# Patient Record
Sex: FEMALE | Race: WHITE | Employment: UNEMPLOYED | URBAN - METROPOLITAN AREA
[De-identification: names, ages, dates, MRNs, and addresses within clinical notes are randomized per-mention and may not be internally consistent; named-entity substitution may affect disease eponyms.]

---

## 2020-10-11 ENCOUNTER — APPOINTMENT (OUTPATIENT)
Dept: CT IMAGING | Age: 40
DRG: 872 | End: 2020-10-11
Payer: MEDICAID

## 2020-10-11 ENCOUNTER — HOSPITAL ENCOUNTER (INPATIENT)
Age: 40
LOS: 2 days | Discharge: HOME | DRG: 872 | End: 2020-10-13
Attending: EMERGENCY MEDICINE | Admitting: INTERNAL MEDICINE
Payer: MEDICAID

## 2020-10-11 ENCOUNTER — APPOINTMENT (OUTPATIENT)
Dept: GENERAL RADIOLOGY | Age: 40
DRG: 872 | End: 2020-10-11
Payer: MEDICAID

## 2020-10-11 PROBLEM — N12 PYELONEPHRITIS: Status: ACTIVE | Noted: 2020-10-11

## 2020-10-11 LAB
A/G RATIO: 1.1 (ref 1.1–2.2)
ALBUMIN SERPL-MCNC: 3.5 G/DL (ref 3.4–5)
ALP BLD-CCNC: 130 U/L (ref 40–129)
ALT SERPL-CCNC: 27 U/L (ref 10–40)
AMPHETAMINE SCREEN, URINE: NORMAL
ANION GAP SERPL CALCULATED.3IONS-SCNC: 14 MMOL/L (ref 3–16)
AST SERPL-CCNC: 30 U/L (ref 15–37)
ATYPICAL LYMPHOCYTE RELATIVE PERCENT: 1 % (ref 0–6)
BACTERIA: ABNORMAL /HPF
BARBITURATE SCREEN URINE: NORMAL
BASOPHILS ABSOLUTE: 0 K/UL (ref 0–0.2)
BASOPHILS RELATIVE PERCENT: 0 %
BENZODIAZEPINE SCREEN, URINE: NORMAL
BILIRUB SERPL-MCNC: <0.2 MG/DL (ref 0–1)
BILIRUBIN URINE: NEGATIVE
BLOOD, URINE: ABNORMAL
BUN BLDV-MCNC: 14 MG/DL (ref 7–20)
CALCIUM SERPL-MCNC: 9.1 MG/DL (ref 8.3–10.6)
CANNABINOID SCREEN URINE: NORMAL
CHLORIDE BLD-SCNC: 101 MMOL/L (ref 99–110)
CLARITY: ABNORMAL
CO2: 21 MMOL/L (ref 21–32)
COCAINE METABOLITE SCREEN URINE: NORMAL
COLOR: YELLOW
CREAT SERPL-MCNC: 1.1 MG/DL (ref 0.6–1.1)
EOSINOPHILS ABSOLUTE: 0.2 K/UL (ref 0–0.6)
EOSINOPHILS RELATIVE PERCENT: 1 %
EPITHELIAL CELLS, UA: ABNORMAL /HPF (ref 0–5)
GFR AFRICAN AMERICAN: >60
GFR NON-AFRICAN AMERICAN: 56
GLOBULIN: 3.1 G/DL
GLUCOSE BLD-MCNC: 113 MG/DL (ref 70–99)
GLUCOSE URINE: NEGATIVE MG/DL
HCG QUALITATIVE: NEGATIVE
HCT VFR BLD CALC: 35.4 % (ref 36–48)
HEMOGLOBIN: 11.8 G/DL (ref 12–16)
KETONES, URINE: ABNORMAL MG/DL
LACTIC ACID, SEPSIS: 1.1 MMOL/L (ref 0.4–1.9)
LACTIC ACID, SEPSIS: 1.3 MMOL/L (ref 0.4–1.9)
LEUKOCYTE ESTERASE, URINE: ABNORMAL
LYMPHOCYTES ABSOLUTE: 2.3 K/UL (ref 1–5.1)
LYMPHOCYTES RELATIVE PERCENT: 12 %
Lab: NORMAL
MCH RBC QN AUTO: 31.6 PG (ref 26–34)
MCHC RBC AUTO-ENTMCNC: 33.4 G/DL (ref 31–36)
MCV RBC AUTO: 94.6 FL (ref 80–100)
METHADONE SCREEN, URINE: NORMAL
MICROSCOPIC EXAMINATION: YES
MONOCYTES ABSOLUTE: 1.7 K/UL (ref 0–1.3)
MONOCYTES RELATIVE PERCENT: 10 %
MUCUS: ABNORMAL /LPF
NEUTROPHILS ABSOLUTE: 13.2 K/UL (ref 1.7–7.7)
NEUTROPHILS RELATIVE PERCENT: 76 %
NITRITE, URINE: POSITIVE
OPIATE SCREEN URINE: NORMAL
OXYCODONE URINE: NORMAL
PDW BLD-RTO: 14.1 % (ref 12.4–15.4)
PH UA: 6
PH UA: 6 (ref 5–8)
PHENCYCLIDINE SCREEN URINE: NORMAL
PLATELET # BLD: 224 K/UL (ref 135–450)
PLATELET SLIDE REVIEW: ADEQUATE
PMV BLD AUTO: 10.2 FL (ref 5–10.5)
POTASSIUM REFLEX MAGNESIUM: 3.7 MMOL/L (ref 3.5–5.1)
PROPOXYPHENE SCREEN: NORMAL
PROTEIN UA: 30 MG/DL
RBC # BLD: 3.74 M/UL (ref 4–5.2)
RBC UA: ABNORMAL /HPF (ref 0–4)
SARS-COV-2, NAAT: NOT DETECTED
SLIDE REVIEW: ABNORMAL
SODIUM BLD-SCNC: 136 MMOL/L (ref 136–145)
SPECIFIC GRAVITY UA: 1.02 (ref 1–1.03)
TOTAL PROTEIN: 6.6 G/DL (ref 6.4–8.2)
URINE TYPE: ABNORMAL
UROBILINOGEN, URINE: 0.2 E.U./DL
WBC # BLD: 17.4 K/UL (ref 4–11)
WBC UA: >100 /HPF (ref 0–5)

## 2020-10-11 PROCEDURE — U0002 COVID-19 LAB TEST NON-CDC: HCPCS

## 2020-10-11 PROCEDURE — 85025 COMPLETE CBC W/AUTO DIFF WBC: CPT

## 2020-10-11 PROCEDURE — 2580000003 HC RX 258: Performed by: PHYSICIAN ASSISTANT

## 2020-10-11 PROCEDURE — 96365 THER/PROPH/DIAG IV INF INIT: CPT

## 2020-10-11 PROCEDURE — 2580000003 HC RX 258

## 2020-10-11 PROCEDURE — 6360000002 HC RX W HCPCS: Performed by: PHYSICIAN ASSISTANT

## 2020-10-11 PROCEDURE — 87086 URINE CULTURE/COLONY COUNT: CPT

## 2020-10-11 PROCEDURE — 96375 TX/PRO/DX INJ NEW DRUG ADDON: CPT

## 2020-10-11 PROCEDURE — 87077 CULTURE AEROBIC IDENTIFY: CPT

## 2020-10-11 PROCEDURE — 80307 DRUG TEST PRSMV CHEM ANLYZR: CPT

## 2020-10-11 PROCEDURE — 99285 EMERGENCY DEPT VISIT HI MDM: CPT

## 2020-10-11 PROCEDURE — 6360000004 HC RX CONTRAST MEDICATION: Performed by: EMERGENCY MEDICINE

## 2020-10-11 PROCEDURE — 87040 BLOOD CULTURE FOR BACTERIA: CPT

## 2020-10-11 PROCEDURE — 81001 URINALYSIS AUTO W/SCOPE: CPT

## 2020-10-11 PROCEDURE — 87186 SC STD MICRODIL/AGAR DIL: CPT

## 2020-10-11 PROCEDURE — 2580000003 HC RX 258: Performed by: EMERGENCY MEDICINE

## 2020-10-11 PROCEDURE — 71045 X-RAY EXAM CHEST 1 VIEW: CPT

## 2020-10-11 PROCEDURE — 6360000002 HC RX W HCPCS: Performed by: EMERGENCY MEDICINE

## 2020-10-11 PROCEDURE — 84703 CHORIONIC GONADOTROPIN ASSAY: CPT

## 2020-10-11 PROCEDURE — 36415 COLL VENOUS BLD VENIPUNCTURE: CPT

## 2020-10-11 PROCEDURE — 83605 ASSAY OF LACTIC ACID: CPT

## 2020-10-11 PROCEDURE — 1200000000 HC SEMI PRIVATE

## 2020-10-11 PROCEDURE — 80053 COMPREHEN METABOLIC PANEL: CPT

## 2020-10-11 PROCEDURE — 74177 CT ABD & PELVIS W/CONTRAST: CPT

## 2020-10-11 PROCEDURE — 6370000000 HC RX 637 (ALT 250 FOR IP): Performed by: PHYSICIAN ASSISTANT

## 2020-10-11 PROCEDURE — 6360000002 HC RX W HCPCS: Performed by: INTERNAL MEDICINE

## 2020-10-11 RX ORDER — MORPHINE SULFATE 2 MG/ML
2 INJECTION, SOLUTION INTRAMUSCULAR; INTRAVENOUS EVERY 6 HOURS PRN
Status: DISCONTINUED | OUTPATIENT
Start: 2020-10-11 | End: 2020-10-13 | Stop reason: HOSPADM

## 2020-10-11 RX ORDER — HYDROXYZINE PAMOATE 25 MG/1
25 CAPSULE ORAL 3 TIMES DAILY PRN
COMMUNITY

## 2020-10-11 RX ORDER — ACETAMINOPHEN 325 MG/1
650 TABLET ORAL EVERY 6 HOURS PRN
Status: DISCONTINUED | OUTPATIENT
Start: 2020-10-11 | End: 2020-10-13 | Stop reason: HOSPADM

## 2020-10-11 RX ORDER — DOXEPIN HYDROCHLORIDE 10 MG/1
10 CAPSULE ORAL NIGHTLY
COMMUNITY

## 2020-10-11 RX ORDER — 0.9 % SODIUM CHLORIDE 0.9 %
1000 INTRAVENOUS SOLUTION INTRAVENOUS ONCE
Status: COMPLETED | OUTPATIENT
Start: 2020-10-11 | End: 2020-10-11

## 2020-10-11 RX ORDER — FENTANYL CITRATE 50 UG/ML
50 INJECTION, SOLUTION INTRAMUSCULAR; INTRAVENOUS EVERY 30 MIN PRN
Status: DISCONTINUED | OUTPATIENT
Start: 2020-10-11 | End: 2020-10-11

## 2020-10-11 RX ORDER — SODIUM CHLORIDE 9 MG/ML
1000 INJECTION, SOLUTION INTRAVENOUS CONTINUOUS
Status: DISCONTINUED | OUTPATIENT
Start: 2020-10-11 | End: 2020-10-11

## 2020-10-11 RX ORDER — ONDANSETRON 2 MG/ML
4 INJECTION INTRAMUSCULAR; INTRAVENOUS EVERY 6 HOURS PRN
Status: DISCONTINUED | OUTPATIENT
Start: 2020-10-11 | End: 2020-10-13 | Stop reason: HOSPADM

## 2020-10-11 RX ORDER — SODIUM CHLORIDE 0.9 % (FLUSH) 0.9 %
10 SYRINGE (ML) INJECTION EVERY 12 HOURS SCHEDULED
Status: DISCONTINUED | OUTPATIENT
Start: 2020-10-11 | End: 2020-10-13 | Stop reason: HOSPADM

## 2020-10-11 RX ORDER — ACETAMINOPHEN 650 MG/1
650 SUPPOSITORY RECTAL EVERY 6 HOURS PRN
Status: DISCONTINUED | OUTPATIENT
Start: 2020-10-11 | End: 2020-10-13 | Stop reason: HOSPADM

## 2020-10-11 RX ORDER — SODIUM CHLORIDE 9 MG/ML
INJECTION, SOLUTION INTRAVENOUS CONTINUOUS
Status: DISCONTINUED | OUTPATIENT
Start: 2020-10-11 | End: 2020-10-13 | Stop reason: HOSPADM

## 2020-10-11 RX ORDER — SODIUM CHLORIDE 9 MG/ML
INJECTION, SOLUTION INTRAVENOUS
Status: COMPLETED
Start: 2020-10-11 | End: 2020-10-11

## 2020-10-11 RX ORDER — SODIUM CHLORIDE 0.9 % (FLUSH) 0.9 %
10 SYRINGE (ML) INJECTION PRN
Status: DISCONTINUED | OUTPATIENT
Start: 2020-10-11 | End: 2020-10-13 | Stop reason: HOSPADM

## 2020-10-11 RX ORDER — POLYETHYLENE GLYCOL 3350 17 G/17G
17 POWDER, FOR SOLUTION ORAL DAILY PRN
Status: DISCONTINUED | OUTPATIENT
Start: 2020-10-11 | End: 2020-10-13 | Stop reason: HOSPADM

## 2020-10-11 RX ORDER — DOXEPIN HYDROCHLORIDE 10 MG/1
10 CAPSULE ORAL NIGHTLY
Status: DISCONTINUED | OUTPATIENT
Start: 2020-10-11 | End: 2020-10-13 | Stop reason: HOSPADM

## 2020-10-11 RX ORDER — DIPHENHYDRAMINE HCL 25 MG
25 TABLET ORAL EVERY 6 HOURS PRN
Status: DISCONTINUED | OUTPATIENT
Start: 2020-10-11 | End: 2020-10-12

## 2020-10-11 RX ORDER — 0.9 % SODIUM CHLORIDE 0.9 %
30 INTRAVENOUS SOLUTION INTRAVENOUS ONCE
Status: COMPLETED | OUTPATIENT
Start: 2020-10-11 | End: 2020-10-11

## 2020-10-11 RX ORDER — PROMETHAZINE HYDROCHLORIDE 25 MG/1
12.5 TABLET ORAL EVERY 6 HOURS PRN
Status: DISCONTINUED | OUTPATIENT
Start: 2020-10-11 | End: 2020-10-13 | Stop reason: HOSPADM

## 2020-10-11 RX ADMIN — DOXEPIN HYDROCHLORIDE 10 MG: 10 CAPSULE ORAL at 20:24

## 2020-10-11 RX ADMIN — SODIUM CHLORIDE 2859 ML: 9 INJECTION, SOLUTION INTRAVENOUS at 11:15

## 2020-10-11 RX ADMIN — IOPAMIDOL 75 ML: 755 INJECTION, SOLUTION INTRAVENOUS at 12:22

## 2020-10-11 RX ADMIN — MORPHINE SULFATE 2 MG: 2 INJECTION, SOLUTION INTRAMUSCULAR; INTRAVENOUS at 16:10

## 2020-10-11 RX ADMIN — SODIUM CHLORIDE 1000 ML: 9 INJECTION, SOLUTION INTRAVENOUS at 13:51

## 2020-10-11 RX ADMIN — FENTANYL CITRATE 50 MCG: 50 INJECTION, SOLUTION INTRAMUSCULAR; INTRAVENOUS at 11:39

## 2020-10-11 RX ADMIN — Medication 10 ML: at 20:19

## 2020-10-11 RX ADMIN — PIPERACILLIN SODIUM AND TAZOBACTAM SODIUM 3.38 G: 3; .375 INJECTION, POWDER, LYOPHILIZED, FOR SOLUTION INTRAVENOUS at 23:17

## 2020-10-11 RX ADMIN — MORPHINE SULFATE 2 MG: 2 INJECTION, SOLUTION INTRAMUSCULAR; INTRAVENOUS at 22:29

## 2020-10-11 RX ADMIN — ENOXAPARIN SODIUM 40 MG: 40 INJECTION SUBCUTANEOUS at 20:24

## 2020-10-11 RX ADMIN — SODIUM CHLORIDE 250 ML: 9 INJECTION, SOLUTION INTRAVENOUS at 20:25

## 2020-10-11 RX ADMIN — ACETAMINOPHEN 650 MG: 325 TABLET ORAL at 16:10

## 2020-10-11 RX ADMIN — SODIUM CHLORIDE 1000 ML: 9 INJECTION, SOLUTION INTRAVENOUS at 16:12

## 2020-10-11 RX ADMIN — PIPERACILLIN SODIUM AND TAZOBACTAM SODIUM 3.38 G: 3; .375 INJECTION, POWDER, LYOPHILIZED, FOR SOLUTION INTRAVENOUS at 16:12

## 2020-10-11 RX ADMIN — CEFTRIAXONE SODIUM 1 G: 1 INJECTION, POWDER, FOR SOLUTION INTRAMUSCULAR; INTRAVENOUS at 12:15

## 2020-10-11 RX ADMIN — SODIUM CHLORIDE: 9 INJECTION, SOLUTION INTRAVENOUS at 16:10

## 2020-10-11 ASSESSMENT — PAIN DESCRIPTION - ORIENTATION
ORIENTATION: RIGHT
ORIENTATION: LEFT
ORIENTATION: RIGHT

## 2020-10-11 ASSESSMENT — PAIN DESCRIPTION - PROGRESSION
CLINICAL_PROGRESSION: GRADUALLY WORSENING
CLINICAL_PROGRESSION: GRADUALLY WORSENING
CLINICAL_PROGRESSION: GRADUALLY IMPROVING

## 2020-10-11 ASSESSMENT — PAIN DESCRIPTION - ONSET
ONSET: PROGRESSIVE
ONSET: ON-GOING

## 2020-10-11 ASSESSMENT — PAIN SCALES - GENERAL
PAINLEVEL_OUTOF10: 10
PAINLEVEL_OUTOF10: 10
PAINLEVEL_OUTOF10: 9
PAINLEVEL_OUTOF10: 8
PAINLEVEL_OUTOF10: 3
PAINLEVEL_OUTOF10: 6
PAINLEVEL_OUTOF10: 7
PAINLEVEL_OUTOF10: 10

## 2020-10-11 ASSESSMENT — PAIN DESCRIPTION - PAIN TYPE
TYPE: ACUTE PAIN

## 2020-10-11 ASSESSMENT — PAIN DESCRIPTION - FREQUENCY
FREQUENCY: CONTINUOUS

## 2020-10-11 ASSESSMENT — PAIN DESCRIPTION - LOCATION
LOCATION: ABDOMEN
LOCATION: ABDOMEN;BACK;FLANK
LOCATION: ABDOMEN

## 2020-10-11 ASSESSMENT — PAIN - FUNCTIONAL ASSESSMENT
PAIN_FUNCTIONAL_ASSESSMENT: ACTIVITIES ARE NOT PREVENTED
PAIN_FUNCTIONAL_ASSESSMENT: PREVENTS OR INTERFERES SOME ACTIVE ACTIVITIES AND ADLS

## 2020-10-11 ASSESSMENT — PAIN DESCRIPTION - DESCRIPTORS
DESCRIPTORS: OTHER (COMMENT)

## 2020-10-11 NOTE — PROGRESS NOTES
Dr. Itzel Villanueva notified of allergy and reviewed. Received verification ok to administer Morphine despite allergy to codeine noted. T/O placed for Morphine 2 mg IV every 6 hours prn.

## 2020-10-11 NOTE — PROGRESS NOTES
Report given @ bedside to [facility], for transferral of care. Pt resting in bed. Call light in reach.

## 2020-10-11 NOTE — PROGRESS NOTES
MEWS 4  and VS improving, except hypotension. NS bolus still infusing. Dr. Judah Amin aware will continue to monitor. Call light in reach.

## 2020-10-11 NOTE — ED PROVIDER NOTES
Emergency Physician Note  [address] [facility] ED  [address] [facility] [place]. [address]  Dept: [phone]  Loc: [phone]  Open Note Time:  2:52 PM    Chief Complaint  Fever       History of Present Illness  Woodroe Leyden is a 42 y.o. female  has a past medical history of Asthma and Seizures ([address] who presents to the ED for ilene pain. Patient was brought into the intermediate. She reports she has had lower abdominal pain for the past 2 days, left is worse than the right. She had developed a fever while in intermediate and they treated her with Tylenol prior to coming to the ER. Patient denies any dysuria, hematuria, nausea, vomiting. Does also report left flank pain. Denies  chills, malaise, chest pain, shortness of breath, cough,  nausea, vomiting, diarrhea, headache, sore throat,  back pain, rash. No palliative/provocative factors. Unless otherwise stated in this report or unable to obtain because of the patient's clinical or mental status as evidenced by the medical record, this patient's positive and negative responses for review of systems, constitutional, psych, eyes, ENT, cardiovascular, respiratory, gastrointestinal, neurological, genitourinary, musculoskeletal, integument systems and systems related to the presenting problem are either stated in the preceding paragraph or were not pertinent or were negative for the symptoms and/or complaints related to the medical problem. I have reviewed the following from the nursing documentation:      Prior to Admission medications    Medication Sig Start Date End Date Taking?  Authorizing Provider   hydrOXYzine (VISTARIL) 25 MG capsule Take 25 mg by mouth 3 times daily as needed for Itching   Yes Historical Provider, MD   doxepin (SINEQUAN) 10 MG capsule Take 10 mg by mouth nightly   Yes Historical Provider, MD   buPROPion (WELLBUTRIN) 100 MG tablet Take 100 mg by mouth 2 times daily    Historical Provider, MD hydrochlorothiazide (HYDRODIURIL) 12.5 MG tablet Take 12.5 mg by mouth daily    Historical Provider, MD   gabapentin (NEURONTIN) 600 MG tablet Take 600 mg by mouth 3 times daily. Historical Provider, MD       Allergies as of 10/11/2020 - Review Complete 10/11/2020   Allergen Reaction Noted    Codeine Swelling 05/03/2012    Ibuprofen Swelling 05/03/2012    Tramadol Swelling 05/03/2012       Past Medical History:   Diagnosis Date    Asthma     Seizures ([facility] [address]         Surgical History:   Past Surgical History:   Procedure Laterality Date    ABDOMEN SURGERY          Family History:  No family history on file.     Social History     Socioeconomic History    Marital status:      Spouse name: Not on file    Number of children: Not on file    Years of education: Not on file    Highest education level: Not on file   Occupational History    Not on file   Social Needs    Financial resource strain: Not on file    Food insecurity     Worry: Not on file     Inability: Not on file    Transportation needs     Medical: Not on file     Non-medical: Not on file   Tobacco Use    Smoking status: Current Every Day Smoker     Packs/day: 1.00     Types: Cigarettes   Substance and Sexual Activity    Alcohol use: No    Drug use: No    Sexual activity: Not on file   Lifestyle    Physical activity     Days per week: Not on file     Minutes per session: Not on file    Stress: Not on file   Relationships    Social connections     Talks on phone: Not on file     Gets together: Not on file     Attends Congregational service: Not on file     Active member of club or organization: Not on file     Attends meetings of clubs or organizations: Not on file     Relationship status: Not on file    Intimate partner violence     Fear of current or ex partner: Not on file     Emotionally abused: Not on file     Physically abused: Not on file     Forced sexual activity: Not on file   Other Topics Concern    Not on file   Social History Narrative    Not on file       Nursing notes reviewed. ED Triage Vitals   Enc Vitals Group      BP 10/11/20 0840 100/63      Pulse 10/11/20 0837 101      Resp 10/11/20 0837 17      Temp 10/11/20 0837 99.9 °F (37.7 °C)      Temp Source 10/11/20 0837 Oral      SpO2 10/11/20 0837 95 %      Weight 10/11/20 0837 210 lb (95.3 kg)      Height 10/11/20 0837 6' 2\" (1.88 m)      Head Circumference --       Peak Flow --       Pain Score --       Pain Loc --       Pain Edu? --       Excl. in GC? --        GENERAL:      Awake, alert. Well developed, well nourished with no apparent distress. Nontoxic-appearing, ill-appearing. HENT:    Normocephalic, Atraumatic, no lacerations. No ENT exam due to PPE. EYES:    Conjunctiva normal,   Pupils equal round and reactive to light,   Extraocular movements normal.  NECK:      Trachea is midline. No stridor. CHEST:      Regular rate and regular rhythm, no murmurs/rubs/gallops,   normal S1/S2, chest wall non-tender. LUNGS:      No respiratory distress. No abdominal retractions, no sternal retractions. Clear to auscultation bilaterally, no wheezing, no rhochi, no rales  Speaking comfortably in full sentences  ABDOMEN:      Soft, moderate bilateral lower quadrant tenderness to palpation, left worse than right, non-distended. No guarding. No rebound. Bilateral costovertebral angle tenderness to palpation, left worse than right. Normal BS, no organomegaly, no abdominal masses  EXTREMITIES:     Moves extremities x4 with purpose. no edema,   no deformity,   distal pulses present and equal bilaterally. SKIN:      Warm, clammy and intact. NEUROLOGIC:    Normal mental status. Moving all extremities to command. Alert and oriented x4   without focal motor deficit or gross sensory deficit. Normal speech.     PSYCHIATRIC:    Not anxious,   normal mood and affect,   thoughts are linear and organized,   without delusions/hallucinations,   Not responding to internal stimuli,  responds appropriately to questions    LABS and DIAGNOSTIC RESULTS    RADIOLOGY  X-RAYS:  I have reviewed radiologic plain film image(s). ALL OTHER NON-PLAIN FILM IMAGES SUCH AS CT, ULTRASOUND AND MRI HAVE BEEN READ BY THE RADIOLOGIST. CT ABDOMEN PELVIS W IV CONTRAST Additional Contrast? None   Final Result   1. Left-sided pyelonephritis as described. 2. Atherosclerotic disease as detailed above which is advanced for patient   age including left-sided coronary artery calcification. XR CHEST PORTABLE   Final Result   No acute airspace disease identified.               LABS  Results for orders placed or performed during the hospital encounter of 10/11/20   CBC auto differential   Result Value Ref Range    WBC 17.4 (H) 4.0 - 11.0 K/uL    RBC 3.74 (L) 4.00 - 5.20 M/uL    Hemoglobin 11.8 (L) 12.0 - 16.0 g/dL    Hematocrit 35.4 (L) 36.0 - 48.0 %    MCV 94.6 80.0 - 100.0 fL    MCH 31.6 26.0 - 34.0 pg    MCHC 33.4 31.0 - 36.0 g/dL    RDW 14.1 12.4 - 15.4 %    Platelets 635 704 - 084 K/uL    MPV 10.2 5.0 - 10.5 fL    PLATELET SLIDE REVIEW Adequate     SLIDE REVIEW see below     Neutrophils % 76.0 %    Lymphocytes % 12.0 %    Monocytes % 10.0 %    Eosinophils % 1.0 %    Basophils % 0.0 %    Neutrophils Absolute 13.2 (H) 1.7 - 7.7 K/uL    Lymphocytes Absolute 2.3 1.0 - 5.1 K/uL    Monocytes Absolute 1.7 (H) 0.0 - 1.3 K/uL    Eosinophils Absolute 0.2 0.0 - 0.6 K/uL    Basophils Absolute 0.0 0.0 - 0.2 K/uL    Atypical Lymphocytes Relative 1 0 - 6 %   Comprehensive Metabolic Panel w/ Reflex to MG   Result Value Ref Range    Sodium 136 136 - 145 mmol/L    Potassium reflex Magnesium 3.7 3.5 - 5.1 mmol/L    Chloride 101 99 - 110 mmol/L    CO2 21 21 - 32 mmol/L    Anion Gap 14 3 - 16    Glucose 113 (H) 70 - 99 mg/dL    BUN 14 7 - 20 mg/dL    CREATININE 1.1 0.6 - 1.1 mg/dL    GFR Non- 56 (A) >60    GFR African American >60 >60    Calcium 9.1 8.3 - 10.6 mg/dL    Total Protein 6.6 6.4 - 8.2 g/dL    Alb 3.5 3.4 - 5.0 g/dL    Albumin/Globulin Ratio 1.1 1.1 - 2.2    Total Bilirubin <0.2 0.0 - 1.0 mg/dL    Alkaline Phosphatase 130 (H) 40 - 129 U/L    ALT 27 10 - 40 U/L    AST 30 15 - 37 U/L    Globulin 3.1 g/dL   Urinalysis, reflex to microscopic   Result Value Ref Range    Color, UA Yellow Straw/Yellow    Clarity, UA SL CLOUDY (A) Clear    Glucose, Ur Negative Negative mg/dL    Bilirubin Urine Negative Negative    Ketones, Urine TRACE (A) Negative mg/dL    Specific Gravity, UA 1.025 1.005 - 1.030    Blood, Urine SMALL (A) Negative    pH, UA 6.0 5.0 - 8.0    Protein, UA 30 (A) Negative mg/dL    Urobilinogen, Urine 0.2 <2.0 E.U./dL    Nitrite, Urine POSITIVE (A) Negative    Leukocyte Esterase, Urine SMALL (A) Negative    Microscopic Examination YES     Urine Type NotGiven    Microscopic Urinalysis   Result Value Ref Range    Mucus, UA 2+ (A) None Seen /LPF    WBC, UA >100 (A) 0 - 5 /HPF    RBC, UA 11-20 (A) 0 - 4 /HPF    Epithelial Cells, UA 21-50 (A) 0 - 5 /HPF    Bacteria, UA 3+ (A) None Seen /HPF   HCG Qualitative, Serum   Result Value Ref Range    hCG Qual Negative Detects HCG level >10 MIU/mL   Urine Drug Screen   Result Value Ref Range    Amphetamine Screen, Urine Neg Negative <1000ng/mL    Barbiturate Screen, Ur Neg Negative <200 ng/mL    Benzodiazepine Screen, Urine Neg Negative <200 ng/mL    Cannabinoid Scrn, Ur Neg Negative <50 ng/mL    Cocaine Metabolite Screen, Urine Neg Negative <300 ng/mL    Opiate Scrn, Ur Neg Negative <300 ng/mL    PCP Screen, Urine Neg Negative <25 ng/mL    Methadone Screen, Urine Neg Negative <300 ng/mL    Propoxyphene Scrn, Ur Neg Negative <300 ng/mL    Oxycodone Urine Neg Negative <100 ng/ml    pH, UA 6.0     Drug Screen Comment: see below    COVID-19   Result Value Ref Range    SARS-CoV-2, NAAT Not Detected Not Detected       PROCEDURES    MEDICAL DECISION MAKING    Procedures/interventions/images ordered for this visit  Orders Placed This Encounter Negative <300 ng/mL    Oxycodone Urine Neg Negative <100 ng/ml    pH, UA 6.0     Drug Screen Comment: see below    COVID-19   Result Value Ref Range    SARS-CoV-2, NAAT Not Detected Not Detected     I spoke with West Turner NP. We thoroughly discussed the history, physical exam, laboratory and imaging studies, as well as, emergency department course. Based upon that discussion, we've decided to admit Krista Son for further observation and evaluation of Rufina Paige's abdominal pain. As I have deemed necessary from their history, physical and studies, I have considered and evaluated Krista Son for the following diagnoses:  ACUTE APPENDICITIS, BOWEL OBSTRUCTION, CHOLECYSTITIS, DIVERTICULITIS, INCARCERATED HERNIA, PANCREATITIS, or PERFORATED BOWEL or ULCER. FINAL IMPRESSION  1. Acute pyelonephritis    2. Hypotension, unspecified hypotension type        Vitals:  Blood pressure 99/60, pulse 106, temperature 103.7 °F (39.8 °C), temperature source Oral, resp. rate 18, height 6' 2\" (1.88 m), weight 212 lb 14.4 oz (96.6 kg), last menstrual period 09/17/2020, SpO2 100 %. Disposition    Pt is in stable condition upon Admit to med/surg floor. Please note, critical care time was at least 30 minutes, obtaining history, conducting a physical exam, performing and monitoring interventions, ordering, collecting and interpreting tests, and establishing medical decision-making and discussion with the patient and/or family, specifically for management of the presenting complaint and symptoms initially, direct bedside care, reevaluation, review of records, and consultation. There was a high probability of clinically significant life-threatening deterioration in the patient's condition, which required my urgent intervention. This time does not include separately billable procedures. The note was completed using Dragon voice recognition transcription.  Every effort was made to ensure accuracy; however, inadvertent transcription errors may be present despite my best efforts to edit errors.     Connie Can MD  [address] [facility]        Connie Can MD  10/11/20 3413

## 2020-10-11 NOTE — PROGRESS NOTES
Patient admitted to room __220__ from ER. Patient oriented to room, call light, bed rails, phone, lights and bathroom. Patient instructed about the schedule of the day including: vital sign frequency, lab draws, possible tests, frequency of MD and staff rounds, daily weights, I &O's and prescribed diet. Bed alarm deferred patient low fall risk and refuses alarm. Telemetry box in place, patient aware of placement and reason. Bed locked, in lowest position, side rails up 2/4, call light within reach. Recliner Assessment:   Patient is able to demonstrated the ability to move from a reclining position to an upright position within the recliner. ?  ?  4 Eyes Skin Assessment:   The patient is being assess for   Admission  I agree that 2 RN's have performed a thorough Head to Toe Skin Assessment on the patient. ALL assessment sites listed below have been assessed. Areas assessed by both nurses:   Head, Face, and Ears   Shoulders, Back, and Chest, Abdomen  Arms, Elbows, and Hands   Coccyx, Sacrum, and Ischium  Legs, Feet, and Heels    Excorated area on left nipple. Frog tattoo on winsome-area and lower back. Multiple scattered scars scattered. **SHARE this note so that the co-signing nurse is able to place an eSignature**  Co-signer eSignature: Electronically signed by Paty Mathews RN on 10/11/20 at 4:25 PM EDT  Does the Patient have Skin Breakdown? No   Bill Prevention initiated: No   Wound Care Orders initiated: No  [facility] nurse consulted for Pressure Injury (Stage 3,4, Unstageable, DTI, NWPT, Complex wounds)and New or Established Ostomies: No   Primary Nurse eSignature: Electronically signed by Jose Luis Pedro RN on 10/11/20 at 3:46 PM EDT  ?

## 2020-10-11 NOTE — PROGRESS NOTES
Notified Dr. Itzel Villanueva of Mews score, temp, and request of sepsis protocol orders. Received new orders and held zosyn until blood cultures done. Pt given tylenol po for fever and morphine IVP for pain/comfort see MAR. Pt resting in bed. Call light in reach. Upper bed rails wrapped for seizure precautions. Pt is shackled per right ankle to bed rail but skin assessment and circulation assessment WNL. [place] in doorway.

## 2020-10-11 NOTE — ED NOTES
Perfect serve message to [facility] Kolb [facility]  10/11/20 27 Keyanna Jonas returned the call to Dr. Farrah Scott [facility]  10/11/20 1488

## 2020-10-12 PROBLEM — N10 ACUTE PYELONEPHRITIS: Status: ACTIVE | Noted: 2020-10-11

## 2020-10-12 LAB
ANION GAP SERPL CALCULATED.3IONS-SCNC: 8 MMOL/L (ref 3–16)
ATYPICAL LYMPHOCYTE RELATIVE PERCENT: 2 % (ref 0–6)
BANDED NEUTROPHILS RELATIVE PERCENT: 7 % (ref 0–7)
BASOPHILS ABSOLUTE: 0 K/UL (ref 0–0.2)
BASOPHILS RELATIVE PERCENT: 0 %
BUN BLDV-MCNC: 9 MG/DL (ref 7–20)
CALCIUM SERPL-MCNC: 7.4 MG/DL (ref 8.3–10.6)
CHLORIDE BLD-SCNC: 105 MMOL/L (ref 99–110)
CO2: 19 MMOL/L (ref 21–32)
CREAT SERPL-MCNC: 0.9 MG/DL (ref 0.6–1.1)
EOSINOPHILS ABSOLUTE: 0.1 K/UL (ref 0–0.6)
EOSINOPHILS RELATIVE PERCENT: 1 %
GFR AFRICAN AMERICAN: >60
GFR NON-AFRICAN AMERICAN: >60
GLUCOSE BLD-MCNC: 89 MG/DL (ref 70–99)
HCT VFR BLD CALC: 29.1 % (ref 36–48)
HEMOGLOBIN: 9.8 G/DL (ref 12–16)
HYPOCHROMIA: ABNORMAL
LYMPHOCYTES ABSOLUTE: 1.9 K/UL (ref 1–5.1)
LYMPHOCYTES RELATIVE PERCENT: 12 %
MAGNESIUM: 1.5 MG/DL (ref 1.8–2.4)
MCH RBC QN AUTO: 32.1 PG (ref 26–34)
MCHC RBC AUTO-ENTMCNC: 33.6 G/DL (ref 31–36)
MCV RBC AUTO: 95.5 FL (ref 80–100)
MONOCYTES ABSOLUTE: 1.7 K/UL (ref 0–1.3)
MONOCYTES RELATIVE PERCENT: 13 %
NEUTROPHILS ABSOLUTE: 9.6 K/UL (ref 1.7–7.7)
NEUTROPHILS RELATIVE PERCENT: 65 %
PDW BLD-RTO: 14.1 % (ref 12.4–15.4)
PLATELET # BLD: 180 K/UL (ref 135–450)
PLATELET SLIDE REVIEW: ADEQUATE
PMV BLD AUTO: 9.9 FL (ref 5–10.5)
POIKILOCYTES: ABNORMAL
POTASSIUM REFLEX MAGNESIUM: 3.3 MMOL/L (ref 3.5–5.1)
RBC # BLD: 3.05 M/UL (ref 4–5.2)
SLIDE REVIEW: ABNORMAL
SODIUM BLD-SCNC: 132 MMOL/L (ref 136–145)
WBC # BLD: 13.4 K/UL (ref 4–11)

## 2020-10-12 PROCEDURE — 6360000002 HC RX W HCPCS: Performed by: NURSE PRACTITIONER

## 2020-10-12 PROCEDURE — 36415 COLL VENOUS BLD VENIPUNCTURE: CPT

## 2020-10-12 PROCEDURE — 1200000000 HC SEMI PRIVATE

## 2020-10-12 PROCEDURE — 83735 ASSAY OF MAGNESIUM: CPT

## 2020-10-12 PROCEDURE — 6370000000 HC RX 637 (ALT 250 FOR IP): Performed by: NURSE PRACTITIONER

## 2020-10-12 PROCEDURE — 85025 COMPLETE CBC W/AUTO DIFF WBC: CPT

## 2020-10-12 PROCEDURE — 2580000003 HC RX 258: Performed by: INTERNAL MEDICINE

## 2020-10-12 PROCEDURE — 6360000002 HC RX W HCPCS: Performed by: PHYSICIAN ASSISTANT

## 2020-10-12 PROCEDURE — 80048 BASIC METABOLIC PNL TOTAL CA: CPT

## 2020-10-12 PROCEDURE — 99231 SBSQ HOSP IP/OBS SF/LOW 25: CPT | Performed by: NURSE PRACTITIONER

## 2020-10-12 PROCEDURE — 2580000003 HC RX 258: Performed by: PHYSICIAN ASSISTANT

## 2020-10-12 PROCEDURE — 6370000000 HC RX 637 (ALT 250 FOR IP): Performed by: PHYSICIAN ASSISTANT

## 2020-10-12 PROCEDURE — 6360000002 HC RX W HCPCS: Performed by: INTERNAL MEDICINE

## 2020-10-12 RX ORDER — POTASSIUM CHLORIDE 20 MEQ/1
40 TABLET, EXTENDED RELEASE ORAL ONCE
Status: COMPLETED | OUTPATIENT
Start: 2020-10-12 | End: 2020-10-12

## 2020-10-12 RX ORDER — HYDROXYZINE PAMOATE 25 MG/1
25 CAPSULE ORAL 3 TIMES DAILY PRN
Status: DISCONTINUED | OUTPATIENT
Start: 2020-10-12 | End: 2020-10-13 | Stop reason: HOSPADM

## 2020-10-12 RX ORDER — GABAPENTIN 300 MG/1
600 CAPSULE ORAL 3 TIMES DAILY
Status: DISCONTINUED | OUTPATIENT
Start: 2020-10-12 | End: 2020-10-13 | Stop reason: HOSPADM

## 2020-10-12 RX ORDER — MAGNESIUM SULFATE IN WATER 40 MG/ML
2 INJECTION, SOLUTION INTRAVENOUS ONCE
Status: COMPLETED | OUTPATIENT
Start: 2020-10-12 | End: 2020-10-12

## 2020-10-12 RX ADMIN — PIPERACILLIN SODIUM AND TAZOBACTAM SODIUM 3.38 G: 3; .375 INJECTION, POWDER, LYOPHILIZED, FOR SOLUTION INTRAVENOUS at 07:47

## 2020-10-12 RX ADMIN — GABAPENTIN 600 MG: 300 CAPSULE ORAL at 20:43

## 2020-10-12 RX ADMIN — SODIUM CHLORIDE: 9 INJECTION, SOLUTION INTRAVENOUS at 20:43

## 2020-10-12 RX ADMIN — Medication 10 ML: at 07:48

## 2020-10-12 RX ADMIN — GABAPENTIN 600 MG: 300 CAPSULE ORAL at 13:47

## 2020-10-12 RX ADMIN — HYDROXYZINE PAMOATE 25 MG: 25 CAPSULE ORAL at 15:58

## 2020-10-12 RX ADMIN — MORPHINE SULFATE 2 MG: 2 INJECTION, SOLUTION INTRAMUSCULAR; INTRAVENOUS at 18:09

## 2020-10-12 RX ADMIN — SODIUM CHLORIDE: 9 INJECTION, SOLUTION INTRAVENOUS at 15:25

## 2020-10-12 RX ADMIN — MORPHINE SULFATE 2 MG: 2 INJECTION, SOLUTION INTRAMUSCULAR; INTRAVENOUS at 04:31

## 2020-10-12 RX ADMIN — POTASSIUM CHLORIDE 40 MEQ: 1500 TABLET, EXTENDED RELEASE ORAL at 09:43

## 2020-10-12 RX ADMIN — MAGNESIUM SULFATE 2 G: 2 INJECTION INTRAVENOUS at 09:43

## 2020-10-12 RX ADMIN — SODIUM CHLORIDE: 9 INJECTION, SOLUTION INTRAVENOUS at 01:59

## 2020-10-12 RX ADMIN — MORPHINE SULFATE 2 MG: 2 INJECTION, SOLUTION INTRAMUSCULAR; INTRAVENOUS at 11:30

## 2020-10-12 RX ADMIN — DOXEPIN HYDROCHLORIDE 10 MG: 10 CAPSULE ORAL at 20:43

## 2020-10-12 RX ADMIN — PIPERACILLIN SODIUM AND TAZOBACTAM SODIUM 3.38 G: 3; .375 INJECTION, POWDER, LYOPHILIZED, FOR SOLUTION INTRAVENOUS at 15:59

## 2020-10-12 RX ADMIN — ACETAMINOPHEN 650 MG: 325 TABLET ORAL at 03:23

## 2020-10-12 ASSESSMENT — PAIN DESCRIPTION - DESCRIPTORS: DESCRIPTORS: OTHER (COMMENT)

## 2020-10-12 ASSESSMENT — PAIN DESCRIPTION - PROGRESSION: CLINICAL_PROGRESSION: GRADUALLY WORSENING

## 2020-10-12 ASSESSMENT — PAIN SCALES - GENERAL
PAINLEVEL_OUTOF10: 6
PAINLEVEL_OUTOF10: 3
PAINLEVEL_OUTOF10: 8

## 2020-10-12 ASSESSMENT — PAIN - FUNCTIONAL ASSESSMENT: PAIN_FUNCTIONAL_ASSESSMENT: ACTIVITIES ARE NOT PREVENTED

## 2020-10-12 ASSESSMENT — PAIN DESCRIPTION - PAIN TYPE: TYPE: ACUTE PAIN

## 2020-10-12 ASSESSMENT — PAIN DESCRIPTION - ORIENTATION: ORIENTATION: LEFT

## 2020-10-12 ASSESSMENT — PAIN DESCRIPTION - ONSET: ONSET: ON-GOING

## 2020-10-12 ASSESSMENT — PAIN DESCRIPTION - FREQUENCY: FREQUENCY: CONTINUOUS

## 2020-10-12 NOTE — PROGRESS NOTES
H/p dictation id [id number]. Date of service 10/11/2020. Left pyelo. Complicated uti/sepsis. Tobacco dependence.

## 2020-10-12 NOTE — H&P
[address]                 [address] [place] [address]                              HISTORY AND PHYSICAL    PATIENT NAME: Laury Anguiano                     :        1980  MED REC NO:   [id number]                          ROOM:       3248  ACCOUNT NO:   [de-identified]                           ADMIT DATE: 10/11/2020  PROVIDER:     Kortney James MD    I obtained a history and performed a physical exam on the patient on the  medical floor on 10/11/2020. CHIEF COMPLAINT:  Abdominal pain with fevers and chills. HISTORY OF PRESENT ILLNESS:  The patient is a 74-year-old   female who presented to the hospital with a chief complaint for a few  days _____ and with significant fevers and chills and rigors with some  nausea without any actual vomiting. The patient states that all this  happened while she was in [facility]. At the time of my exam, the patient  still states that she is feeling the chills and the shakiness. PAST MEDICAL HISTORY:  Asthma, seizure. PAST SURGICAL HISTORY:  _____. CURRENT MEDICATIONS:  Vistaril, HydroDiuril, Neurontin. ALLERGIES:  CODEINE, IBUPROFEN, _____ and TRAMADOL. REVIEW OF SYSTEMS:  Significant for the abdominal pain and fevers and  chills and per the history of present illness. All other systems have  been reviewed and are negative except for the history of present  illness. PHYSICAL EXAMINATION:  VITAL SIGNS:  T-max 103.7, respiratory rate 18, pulse 106, blood  pressure 99/62, 89/47 repeat was 104/60, saturating 97%. CNS:  Alert, awake, and oriented. PSYCH:  Cooperative. EYES:  Pupils are bilaterally equal.  ENT:  No oral mucosal lesions. The patient is completely edentulous. RESPIRATORY SYSTEM:  No audible rhonchi. CVS:  Nontachycardic. ABDOMEN:  Soft. The patient has significant left costovertebral angle  tenderness to palpation.   MUSCULOSKELETAL:  No acute obvious musculoskeletal deformities. SKIN:  Without rashes or lesions. LABORATORY DATA:  Lactic acid 1.1, COVID-19 testing is negative. Urine  drug screen negative. CT of abdomen and pelvis with IV contrast shows  left-sided pyelonephritis, beta-hCG negative. UA shows greater than 100  white cells lot of epithelial cells, 3+ bacteria. CBC showed white  count 17.4. Urine shows positive nitrites, small leukocyte esterase. BUN 14, creatinine 1.1. X-ray of the chest showed no acute respiratory  disease. IMPRESSION:  1. Acute complicated Gram-negative bacterial urinary tract infection in  the form of pyelonephritis on the left side with sepsis. 2.  Tobacco dependent. 3.  Hypertension. PLAN OF CARE:  The patient is admitted to Internal Medicine Service. IV  Zosyn initiated. IV hydration initiated. We will withhold the  patient's anti-hypertensive medications at this point in time. CODE STATUS:  Full. EXPECTED LENGTH OF STAY:  More than two midnights based on the plan of  care above. RISK:  High due to the patient's presentation with significant  complicated UTI, requiring IV antibiotics. DISPOSITION:  Admit to Internal Medicine Service.         Brandon Perales MD    D: 10/12/2020 3:42:18       T: 10/12/2020 5:10:19     [email or web address]  Job#: [id number]     Doc#: [id number]    CC:

## 2020-10-12 NOTE — PROGRESS NOTES
Pt resting. Resp e/e. Shift assessment completed and charted. Pt no longer in custody, per deputy pt is O.R. bonded from [facility]. No needs. Will monitor.  Zhou Hurt

## 2020-10-12 NOTE — PROGRESS NOTES
AM Assessment complete at this time. Lovenox refused. Alert and oriented x 4. Respirations easy and unlabored. Pain noted in left side abdominal and flank non pharm pain relief expressed, no needs noted at this time. Patient in bed, call light within reach.

## 2020-10-12 NOTE — PROGRESS NOTES
Bedside report given and pt care transferred to [facility]. Pt denies needs at this time. Call light within reach.

## 2020-10-12 NOTE — PROGRESS NOTES
Progress Note    Admit Date:  10/11/2020    42year old female presented with abdominal pain, fever, chills, and nausea. Admitted for acute pyelonephritis, UTI, sepsis. Subjective:  Ms. Satnam Calvo states she is still having pain. Slightly improved. Did have fever 102.9 early this morning. Objective:   Vitals:    10/12/20 0800   BP: 97/67   Pulse: 92   Resp: 18   Temp: 98.4 °F (36.9 °C)   SpO2:             Intake/Output Summary (Last 24 hours) at 10/12/2020 1214  Last data filed at 10/12/2020 0159  Gross per 24 hour   Intake 9351 ml   Output 950 ml   Net 8401 ml       Physical Exam:  Gen: No distress. Alert. Eyes: PERRL. No sclera icterus. No conjunctival injection. ENT: No discharge. Pharynx clear. Neck: NTrachea midline. Resp: No accessory muscle use. No crackles. No wheezes. No rhonchi. CV: Regular rate. Regular rhythm. No murmur. No rub. No edema. Capillary Refill: Brisk,< 3 seconds   Peripheral Pulses: +2 palpable, equal bilaterally   GI: Non-tender. Non-distended. Normal bowel sounds. No hernia. Skin: Warm and dry. No nodule on exposed extremities. No rash on exposed extremities. M/S: No cyanosis. No joint deformity. No clubbing. Neuro: Awake. Grossly nonfocal    Psych: Oriented x 3. No anxiety or agitation      Data:  CBC:   Recent Labs     10/11/20  0856 10/12/20  0531   WBC 17.4* 13.4*   HGB 11.8* 9.8*   HCT 35.4* 29.1*   MCV 94.6 95.5    180     BMP:   Recent Labs     10/11/20  0856 10/12/20  0530    132*   K 3.7 3.3*    105   CO2 21 19*   BUN 14 9   CREATININE 1.1 0.9     LIVER PROFILE:   Recent Labs     10/11/20  0856   AST 30   ALT 27   BILITOT <0.2   ALKPHOS 130*     PT/INR: No results for input(s): PROTIME, INR in the last 72 hours. CULTURES  Blood: pending    RADIOLOGY  CT ABDOMEN PELVIS W IV CONTRAST Additional Contrast? None   Final Result   1. Left-sided pyelonephritis as described.    2. Atherosclerotic disease as detailed above which is advanced for patient   age including left-sided coronary artery calcification. XR CHEST PORTABLE   Final Result   No acute airspace disease identified. Assessment/Plan:  Acute pyelonephritis  UTI  - admitted to med-surg.    - Treat with Zosyn D#2.  - IVFs. - pain control: Morphine as needed. - antiemetics as needed. Sepsis  - Fever, hypotension, lactic acidosis  - due to above  - urine, blood cx pending  - IVFs, antibiotics as above. Hypokalemia  Hypomagnesemia  - replace electrolytes. Monitor labs. Hypertension  - Holding HCTZ as BP is borderline. Neuropathy  - continue Gabapentin.      Tobacco Dependence  - Recommended cessation    DVT Prophylaxis: Lovenox  Diet: DIET GENERAL;  Code Status: Full Code    Plan of care discussed with Dr. Mariusz Leonard FNP-C  10/12/2020

## 2020-10-12 NOTE — PLAN OF CARE
Problem: Pain:  Goal: Pain level will decrease  Description: Pain level will decrease  10/12/2020 7062 by Carolyn Dunn RN  Outcome: Ongoing  10/12/2020 0310 by Kayla Farmer RN  Outcome: Ongoing  Goal: Control of acute pain  Description: Control of acute pain  10/12/2020 0923 by Carolyn Dunn RN  Outcome: Ongoing  10/12/2020 0310 by Kayla Farmer RN  Outcome: Ongoing  Goal: Control of chronic pain  Description: Control of chronic pain  10/12/2020 0923 by Carolyn Dunn RN  Outcome: Ongoing  10/12/2020 0310 by Kayla Farmer RN  Outcome: Ongoing     Problem: Falls - Risk of:  Goal: Will remain free from falls  Description: Will remain free from falls  10/12/2020 0923 by Carolyn Dunn RN  Outcome: Ongoing  10/12/2020 0310 by Kayla Farmer RN  Outcome: Ongoing  Goal: Absence of physical injury  Description: Absence of physical injury  10/12/2020 0923 by Carolyn Dunn RN  Outcome: Ongoing  10/12/2020 0310 by Kayla Farmer RN  Outcome: Ongoing

## 2020-10-13 VITALS
WEIGHT: 212.9 LBS | DIASTOLIC BLOOD PRESSURE: 54 MMHG | HEART RATE: 68 BPM | SYSTOLIC BLOOD PRESSURE: 97 MMHG | RESPIRATION RATE: 16 BRPM | HEIGHT: 72 IN | BODY MASS INDEX: 28.84 KG/M2 | OXYGEN SATURATION: 98 % | TEMPERATURE: 98 F

## 2020-10-13 LAB
ANION GAP SERPL CALCULATED.3IONS-SCNC: 11 MMOL/L (ref 3–16)
BUN BLDV-MCNC: 7 MG/DL (ref 7–20)
CALCIUM SERPL-MCNC: 7.8 MG/DL (ref 8.3–10.6)
CHLORIDE BLD-SCNC: 106 MMOL/L (ref 99–110)
CO2: 20 MMOL/L (ref 21–32)
CREAT SERPL-MCNC: 0.8 MG/DL (ref 0.6–1.1)
GFR AFRICAN AMERICAN: >60
GFR NON-AFRICAN AMERICAN: >60
GLUCOSE BLD-MCNC: 113 MG/DL (ref 70–99)
HCT VFR BLD CALC: 27.7 % (ref 36–48)
HEMOGLOBIN: 9.4 G/DL (ref 12–16)
MAGNESIUM: 1.9 MG/DL (ref 1.8–2.4)
MCH RBC QN AUTO: 31.3 PG (ref 26–34)
MCHC RBC AUTO-ENTMCNC: 33.8 G/DL (ref 31–36)
MCV RBC AUTO: 92.7 FL (ref 80–100)
PDW BLD-RTO: 14.5 % (ref 12.4–15.4)
PLATELET # BLD: 232 K/UL (ref 135–450)
PMV BLD AUTO: 10.1 FL (ref 5–10.5)
POTASSIUM REFLEX MAGNESIUM: 3.7 MMOL/L (ref 3.5–5.1)
RBC # BLD: 2.99 M/UL (ref 4–5.2)
SODIUM BLD-SCNC: 137 MMOL/L (ref 136–145)
WBC # BLD: 14.1 K/UL (ref 4–11)

## 2020-10-13 PROCEDURE — 83735 ASSAY OF MAGNESIUM: CPT

## 2020-10-13 PROCEDURE — 99238 HOSP IP/OBS DSCHRG MGMT 30/<: CPT | Performed by: INTERNAL MEDICINE

## 2020-10-13 PROCEDURE — 6360000002 HC RX W HCPCS: Performed by: PHYSICIAN ASSISTANT

## 2020-10-13 PROCEDURE — 6370000000 HC RX 637 (ALT 250 FOR IP): Performed by: PHYSICIAN ASSISTANT

## 2020-10-13 PROCEDURE — 2580000003 HC RX 258: Performed by: INTERNAL MEDICINE

## 2020-10-13 PROCEDURE — 2580000003 HC RX 258: Performed by: PHYSICIAN ASSISTANT

## 2020-10-13 PROCEDURE — 6360000002 HC RX W HCPCS: Performed by: INTERNAL MEDICINE

## 2020-10-13 PROCEDURE — 6370000000 HC RX 637 (ALT 250 FOR IP): Performed by: NURSE PRACTITIONER

## 2020-10-13 PROCEDURE — 36415 COLL VENOUS BLD VENIPUNCTURE: CPT

## 2020-10-13 PROCEDURE — 85027 COMPLETE CBC AUTOMATED: CPT

## 2020-10-13 PROCEDURE — 80048 BASIC METABOLIC PNL TOTAL CA: CPT

## 2020-10-13 RX ORDER — OXYCODONE HYDROCHLORIDE AND ACETAMINOPHEN 5; 325 MG/1; MG/1
1 TABLET ORAL EVERY 6 HOURS PRN
Qty: 20 TABLET | Refills: 0 | Status: SHIPPED | OUTPATIENT
Start: 2020-10-13 | End: 2020-10-18

## 2020-10-13 RX ORDER — CIPROFLOXACIN 250 MG/1
250 TABLET, FILM COATED ORAL 2 TIMES DAILY
Qty: 20 TABLET | Refills: 0 | Status: SHIPPED | OUTPATIENT
Start: 2020-10-13 | End: 2020-10-23

## 2020-10-13 RX ORDER — LACTOBACILLUS RHAMNOSUS GG 10B CELL
1 CAPSULE ORAL 2 TIMES DAILY
Qty: 30 CAPSULE | Refills: 0 | Status: SHIPPED | OUTPATIENT
Start: 2020-10-13

## 2020-10-13 RX ADMIN — MORPHINE SULFATE 2 MG: 2 INJECTION, SOLUTION INTRAMUSCULAR; INTRAVENOUS at 06:13

## 2020-10-13 RX ADMIN — SODIUM CHLORIDE: 9 INJECTION, SOLUTION INTRAVENOUS at 11:10

## 2020-10-13 RX ADMIN — PIPERACILLIN SODIUM AND TAZOBACTAM SODIUM 3.38 G: 3; .375 INJECTION, POWDER, LYOPHILIZED, FOR SOLUTION INTRAVENOUS at 08:35

## 2020-10-13 RX ADMIN — GABAPENTIN 600 MG: 300 CAPSULE ORAL at 13:40

## 2020-10-13 RX ADMIN — PIPERACILLIN SODIUM AND TAZOBACTAM SODIUM 3.38 G: 3; .375 INJECTION, POWDER, LYOPHILIZED, FOR SOLUTION INTRAVENOUS at 00:11

## 2020-10-13 RX ADMIN — GABAPENTIN 600 MG: 300 CAPSULE ORAL at 08:35

## 2020-10-13 RX ADMIN — MORPHINE SULFATE 2 MG: 2 INJECTION, SOLUTION INTRAMUSCULAR; INTRAVENOUS at 00:11

## 2020-10-13 RX ADMIN — ACETAMINOPHEN 650 MG: 325 TABLET ORAL at 03:23

## 2020-10-13 RX ADMIN — MORPHINE SULFATE 2 MG: 2 INJECTION, SOLUTION INTRAMUSCULAR; INTRAVENOUS at 12:17

## 2020-10-13 ASSESSMENT — PAIN SCALES - GENERAL
PAINLEVEL_OUTOF10: 0
PAINLEVEL_OUTOF10: 8
PAINLEVEL_OUTOF10: 0
PAINLEVEL_OUTOF10: 4

## 2020-10-13 ASSESSMENT — PAIN DESCRIPTION - LOCATION
LOCATION: ABDOMEN;FLANK
LOCATION: ABDOMEN;FLANK

## 2020-10-13 ASSESSMENT — PAIN DESCRIPTION - PAIN TYPE
TYPE: ACUTE PAIN
TYPE: ACUTE PAIN

## 2020-10-13 ASSESSMENT — PAIN DESCRIPTION - ORIENTATION
ORIENTATION: LEFT;MID
ORIENTATION: LEFT

## 2020-10-13 NOTE — PROGRESS NOTES
AM assessment complete at this time. No needs at this time. Complaint of pain in left abdomen into flank. Lovenox refused. BP low taken twice, WBC elevated. Alert and oriented x 4, patient in bed, call light within reach.

## 2020-10-13 NOTE — PLAN OF CARE
Problem: Pain:  Goal: Pain level will decrease  Description: Pain level will decrease  Outcome: Ongoing  Goal: Control of acute pain  Description: Control of acute pain  Outcome: Ongoing     Problem: Infection:  Goal: Will remain free from infection  Description: Will remain free from infection  Outcome: Ongoing     Problem: Daily Care:  Goal: Daily care needs are met  Description: Daily care needs are met  Outcome: Ongoing     Problem: Skin Integrity:  Goal: Skin integrity will stabilize  Description: Skin integrity will stabilize  Outcome: Ongoing     Problem: Discharge Planning:  Goal: Patients continuum of care needs are met  Description: Patients continuum of care needs are met  Outcome: Ongoing

## 2020-10-13 NOTE — PROGRESS NOTES
Bedside report given to [employer] RN  pt in stable condition no needs at this time.  Call light within reach

## 2020-10-13 NOTE — PLAN OF CARE
Problem: Pain:  Goal: Pain level will decrease  Description: Pain level will decrease  10/13/2020 1354 by Isabella Alarcon RN  Outcome: Completed  10/13/2020 1006 by Isabella Alarcon RN  Outcome: Ongoing  Goal: Control of acute pain  Description: Control of acute pain  10/13/2020 1354 by Isabella Alarcon RN  Outcome: Completed  10/13/2020 1006 by Isabella Alarcon RN  Outcome: Ongoing  Goal: Control of chronic pain  Description: Control of chronic pain  10/13/2020 1354 by Isabella Alarcon RN  Outcome: Completed  10/13/2020 1006 by Isabella Alarcon RN  Outcome: Ongoing  Goal: Patient's pain/discomfort is manageable  Description: Patient's pain/discomfort is manageable  10/13/2020 1354 by Isabella Alarcon RN  Outcome: Completed  10/13/2020 1006 by Isabella Alarcon RN  Outcome: Ongoing     Problem: Falls - Risk of:  Goal: Will remain free from falls  Description: Will remain free from falls  10/13/2020 1354 by Isabella Alarcon RN  Outcome: Completed  10/13/2020 1006 by Isabella Alarcon RN  Outcome: Ongoing  Goal: Absence of physical injury  Description: Absence of physical injury  10/13/2020 1354 by Isabella Alarcon RN  Outcome: Completed  10/13/2020 1006 by Isabella Alarcon RN  Outcome: Ongoing     Problem: Infection:  Goal: Will remain free from infection  Description: Will remain free from infection  10/13/2020 1354 by Isabella Alarcon RN  Outcome: Completed  10/13/2020 1006 by Isabella Alarcon RN  Outcome: Ongoing     Problem: Safety:  Goal: Free from accidental physical injury  Description: Free from accidental physical injury  10/13/2020 1354 by Isabella Alarcon RN  Outcome: Completed  10/13/2020 1006 by Isabella Alarcon RN  Outcome: Ongoing  Goal: Free from intentional harm  Description: Free from intentional harm  10/13/2020 1354 by Isabella Alarcon RN  Outcome: Completed  10/13/2020 1006 by Isabella Alarcon RN  Outcome: Ongoing     Problem: Daily Care:  Goal: Daily care needs are met  Description: Daily care needs are met  10/13/2020 1354 by Hari Yañez RN  Outcome: Completed  10/13/2020 1006 by Hari Yañez RN  Outcome: Ongoing     Problem: Skin Integrity:  Goal: Skin integrity will stabilize  Description: Skin integrity will stabilize  10/13/2020 1354 by Hari Yañez RN  Outcome: Completed  10/13/2020 1006 by Hari Yañez RN  Outcome: Ongoing     Problem: Discharge Planning:  Goal: Patients continuum of care needs are met  Description: Patients continuum of care needs are met  10/13/2020 1354 by Hari Yañez RN  Outcome: Completed  10/13/2020 1006 by Hari Yañez RN  Outcome: Ongoing

## 2020-10-13 NOTE — FLOWSHEET NOTE
10/12/20 1934   Vital Signs   Temp 98.5 °F (36.9 °C)   Temp Source Oral   Pulse 86   Heart Rate Source Monitor   Resp 18   /64   BP Location Right upper arm   BP Upper/Lower Upper   Patient Position Semi fowlers   Level of Consciousness 0   MEWS Score 1   Oxygen Therapy   SpO2 98 %   O2 Device None (Room air)   Pt A/O x 4 sitting up in bed. Assessment completed. Pt complains of flank on left side but also abdominal cramps pt started her menstrual cycle today. PM meds given.  Pt denies any needs call light within reach

## 2020-10-13 NOTE — PLAN OF CARE
Problem: Pain:  Goal: Pain level will decrease  Description: Pain level will decrease  10/13/2020 1006 by Hari Yañez RN  Outcome: Ongoing  10/12/2020 2353 by Chad Valencia RN  Outcome: Ongoing  Goal: Control of acute pain  Description: Control of acute pain  10/13/2020 1006 by Hari Yañez RN  Outcome: Ongoing  10/12/2020 2353 by Chad Valencia RN  Outcome: Ongoing  Goal: Control of chronic pain  Description: Control of chronic pain  Outcome: Ongoing  Goal: Patient's pain/discomfort is manageable  Description: Patient's pain/discomfort is manageable  Outcome: Ongoing     Problem: Falls - Risk of:  Goal: Will remain free from falls  Description: Will remain free from falls  Outcome: Ongoing  Goal: Absence of physical injury  Description: Absence of physical injury  Outcome: Ongoing     Problem: Infection:  Goal: Will remain free from infection  Description: Will remain free from infection  10/13/2020 1006 by Hari Yañez RN  Outcome: Ongoing  10/12/2020 2353 by Chad Valencia RN  Outcome: Ongoing     Problem: Safety:  Goal: Free from accidental physical injury  Description: Free from accidental physical injury  Outcome: Ongoing  Goal: Free from intentional harm  Description: Free from intentional harm  Outcome: Ongoing     Problem: Daily Care:  Goal: Daily care needs are met  Description: Daily care needs are met  10/13/2020 1006 by Hari Yañez RN  Outcome: Ongoing  10/12/2020 2353 by Chad Valencia RN  Outcome: Ongoing     Problem: Skin Integrity:  Goal: Skin integrity will stabilize  Description: Skin integrity will stabilize  10/13/2020 1006 by Hari Yañez RN  Outcome: Ongoing  10/12/2020 2353 by Chad Valencia RN  Outcome: Ongoing     Problem: Discharge Planning:  Goal: Patients continuum of care needs are met  Description: Patients continuum of care needs are met  10/13/2020 1006 by Hari Yañez RN  Outcome: Ongoing  10/12/2020 2353 by Chad Vlaencia RN  Outcome: Ongoing

## 2020-10-14 LAB
ORGANISM: ABNORMAL
URINE CULTURE, ROUTINE: ABNORMAL

## 2020-10-14 NOTE — DISCHARGE SUMMARY
Name:  Chelsea [place]  Room:  9899/2440-89  MRN:    [id number]    Discharge Summary      This discharge summary is in conjunction with a complete physical exam done on the day of discharge. Attending Physician: Dr. Marleni Davila  Discharging Physician: Dr. Minh Melgar: 10/11/2020  Discharge:  10/13/2020    HPI:  The patient is a 72-year-old   female who presented to the hospital with a chief complaint for a few  days _____ and with significant fevers and chills and rigors with some  nausea without any actual vomiting. The patient states that all this  happened while she was in long-term. At the time of my exam, the patient  still states that she is feeling the chills and the shakiness. Diagnoses this Admission and Hospital Course   Acute pyelonephritis  UTI  - admitted to med-surg.    - Treat with Zosyn D#2.  - IVFs. - pain control: Morphine as needed. - antiemetics as needed. - improved today . Urine cx with E. Coli. D/c on Cipro. Given Rx. For Percocet. Recommended probiotic.      Sepsis- improved  - Fever, hypotension, lactic acidosis  - due to above  - urine cx with E. coli, blood cx NGTD  - IVFs, antibiotics as above.      Hypokalemia  Hypomagnesemia  - replaced electrolytes. Monitored labs.      Hypertension  - Held/discontinued HCTZ as BP is borderline.     Neuropathy  - continued Gabapentin.      Tobacco Dependence  - Recommended cessation     DVT Prophylaxis: Lovenox    Procedures (Please Review Full Report for Details)  N/A    Consults    N/A      Physical Exam at Discharge:    BP (!) 97/54   Pulse 68   Temp 98 °F (36.7 °C) (Oral)   Resp 16   Ht 6' 2\" (1.88 m)   Wt 212 lb 14.4 oz (96.6 kg)   LMP 09/17/2020   SpO2 98%   BMI 27.33 kg/m²   Gen: No distress. Alert. Eyes: PERRL. No sclera icterus. No conjunctival injection. ENT: No discharge. Pharynx clear. Neck: NTrachea midline. Resp: No accessory muscle use. No crackles. No wheezes. No rhonchi. CV: Regular rate.  Regular rhythm. No murmur. No rub. No edema. Capillary Refill: Brisk,< 3 seconds   Peripheral Pulses: +2 palpable, equal bilaterally   GI: Non-tender. Non-distended. Normal bowel sounds. No hernia. Skin: Warm and dry. No nodule on exposed extremities. No rash on exposed extremities. M/S: No cyanosis. No joint deformity. No clubbing. Neuro: Awake. Grossly nonfocal    Psych: Oriented x 3. No anxiety or agitation    CBC:   Recent Labs     10/11/20  0856 10/12/20  0531 10/13/20  0518   WBC 17.4* 13.4* 14.1*   HGB 11.8* 9.8* 9.4*   HCT 35.4* 29.1* 27.7*   MCV 94.6 95.5 92.7    180 232     BMP:   Recent Labs     10/11/20  0856 10/12/20  0530 10/13/20  0518    132* 137   K 3.7 3.3* 3.7    105 106   CO2 21 19* 20*   BUN 14 9 7   CREATININE 1.1 0.9 0.8     LIVER PROFILE:   Recent Labs     10/11/20  0856   AST 30   ALT 27   BILITOT <0.2   ALKPHOS 130*     UA:  Recent Labs     10/11/20  0900   COLORU Yellow   PHUR 6.0  6.0   WBCUA >100*   RBCUA 11-20*   MUCUS 2+*   BACTERIA 3+*   CLARITYU SL CLOUDY*   SPECGRAV 1.025   LEUKOCYTESUR SMALL*   UROBILINOGEN 0.2   BILIRUBINUR Negative   BLOODU SMALL*   GLUCOSEU Negative           CULTURES  Urine: E. Coli  Blood: NGTD    RADIOLOGY  CT ABDOMEN PELVIS W IV CONTRAST Additional Contrast? None   Final Result   1. Left-sided pyelonephritis as described. 2. Atherosclerotic disease as detailed above which is advanced for patient   age including left-sided coronary artery calcification. XR CHEST PORTABLE   Final Result   No acute airspace disease identified. Discharge Medications     Medication List      START taking these medications    ciprofloxacin 250 MG tablet  Commonly known as:  CIPRO  Take 1 tablet by mouth 2 times daily for 10 days  Notes to patient:  . Ciprofloxacin (Cipro®)  Use: treat infections    Side effects: dizziness, nausea, diarrhea, or     headache.   Finish all this medication     lactobacillus capsule  Take 1 capsule by mouth 2 times daily     oxyCODONE-acetaminophen 5-325 MG per tablet  Commonly known as:  Percocet  Take 1 tablet by mouth every 6 hours as needed for Pain for up to 5 days. Intended supply: 3 days. Take lowest dose possible to manage pain  Notes to patient:  . Oxycontin(Oxycodone)  Use:  pain    Side effects: sleepiness, constipation        CONTINUE taking these medications    doxepin 10 MG capsule  Commonly known as:  SINEQUAN     gabapentin 600 MG tablet  Commonly known as:  NEURONTIN     hydrOXYzine 25 MG capsule  Commonly known as:  VISTARIL        STOP taking these medications    buPROPion 100 MG tablet  Commonly known as:  WELLBUTRIN     hydroCHLOROthiazide 12.5 MG tablet  Commonly known as:  HYDRODIURIL           Where to Get Your Medications      You can get these medications from any pharmacy    Bring a paper prescription for each of these medications  · ciprofloxacin 250 MG tablet  · lactobacillus capsule  · oxyCODONE-acetaminophen 5-325 MG per tablet           Discharged in stable condition to home. Follow Up: Follow up with PCP.       Moo Matos MD  10/13/2020

## 2020-10-15 LAB
BLOOD CULTURE, ROUTINE: NORMAL
CULTURE, BLOOD 2: NORMAL